# Patient Record
Sex: MALE | Race: BLACK OR AFRICAN AMERICAN | Employment: UNEMPLOYED | URBAN - METROPOLITAN AREA
[De-identification: names, ages, dates, MRNs, and addresses within clinical notes are randomized per-mention and may not be internally consistent; named-entity substitution may affect disease eponyms.]

---

## 2018-05-30 ENCOUNTER — HOSPITAL ENCOUNTER (EMERGENCY)
Facility: CLINIC | Age: 23
Discharge: HOME | End: 2018-05-30
Attending: EMERGENCY MEDICINE | Admitting: EMERGENCY MEDICINE

## 2018-05-30 VITALS
SYSTOLIC BLOOD PRESSURE: 115 MMHG | TEMPERATURE: 97.2 F | RESPIRATION RATE: 18 BRPM | WEIGHT: 135.2 LBS | DIASTOLIC BLOOD PRESSURE: 61 MMHG | OXYGEN SATURATION: 97 %

## 2018-05-30 DIAGNOSIS — F12.10 CANNABIS ABUSE: ICD-10-CM

## 2018-05-30 PROCEDURE — 99283 EMERGENCY DEPT VISIT LOW MDM: CPT

## 2018-05-30 PROCEDURE — 99283 EMERGENCY DEPT VISIT LOW MDM: CPT | Mod: Z6 | Performed by: EMERGENCY MEDICINE

## 2018-05-30 ASSESSMENT — ENCOUNTER SYMPTOMS
SHORTNESS OF BREATH: 0
FEVER: 0
ABDOMINAL PAIN: 0

## 2018-05-30 NOTE — ED AVS SNAPSHOT
[facility], Emergency Department    [address]    [place] [address]    Phone:  [phone]    Fax:  [phone]                                       Tommy Patrick   MRN: [id number]    Department:  [facility], Emergency Department   Date of Visit:  5/30/2018           Patient Information     Date Of Birth          1995        Your diagnoses for this visit were:     Cannabis abuse        You were seen by Washington Cid MD.        Discharge Instructions       Have an outpatient CD assessment, call [phone]    24 Hour Appointment Hotline       To make an appointment at any [facility] clinic, call [phone] ([phone]). If you don't have a family doctor or clinic, we will help you find one. [facility] clinics are conveniently located to serve the needs of you and your family.             Review of your medicines      Notice     You have not been prescribed any medications.            Orders Needing Specimen Collection     None      Pending Results     No orders found from 5/28/2018 to 5/31/2018.            Pending Culture Results     No orders found from 5/28/2018 to 5/31/2018.            Pending Results Instructions     If you had any lab results that were not finalized at the time of your Discharge, you can call the ED Lab Result RN at [phone]. You will be contacted by this team for any positive Lab results or changes in treatment. The nurses are available 7 days a week from 10A to 6:30P.  You can leave a message 24 hours per day and they will return your call.        Thank you for choosing [facility]       Thank you for choosing [facility] for your care. Our goal is always to provide you with excellent care. Hearing back from our patients is one way we can continue to improve our services. Please take a few minutes to complete the written survey that you may receive in the mail after you visit with us. Thank you!        [facility] Information     [facility] lets you send  "messages to your doctor, view your test results, renew your prescriptions, schedule appointments and more. To sign up, go to [email or web address] . Click on \"Log in\" on the left side of the screen, which will take you to the Welcome page. Then click on \"Sign up Now\" on the right side of the page.     You will be asked to enter the access code listed below, as well as some personal information. Please follow the directions to create your username and password.     Your access code is: [email or web address]  Expires: 2018  8:13 PM     Your access code will  in 90 days. If you need help or a new code, please call your [facility] clinic or [phone].        Care EveryWhere ID     This is your Care EveryWhere ID. This could be used by other organizations to access your [facility] medical records  [id number]        Equal Access to Services     JAMSHID MCKINLEY : Henrique Sierra, waerwin jimenez, qaely kaalmakaitlin bates, melvin rivera . So [facility] [phone].    ATENCIÓN: Si habla español, tiene a seaman disposición servicios gratuitos de asistencia lingüística. Llame al [phone].    We comply with applicable federal civil rights laws and [place] laws. We do not discriminate on the basis of race, color, national origin, age, disability, sex, sexual orientation, or gender identity.            After Visit Summary       This is your record. Keep this with you and show to your community pharmacist(s) and doctor(s) at your next visit.                  "

## 2018-05-30 NOTE — ED AVS SNAPSHOT
[facility], [place], Emergency Department    [address]    [facility] [address]    Phone:  [phone]    Fax:  [phone]                                       Tommy Patrick   MRN: [id number]    Department:  [facility], Emergency Department   Date of Visit:  5/30/2018           After Visit Summary Signature Page     I have received my discharge instructions, and my questions have been answered. I have discussed any challenges I see with this plan with the nurse or doctor.    ..........................................................................................................................................  Patient/Patient Representative Signature      ..........................................................................................................................................  Patient Representative Print Name and Relationship to Patient    ..................................................               ................................................  Date                                            Time    ..........................................................................................................................................  Reviewed by Signature/Title    ...................................................              ..............................................  Date                                                            Time

## 2018-05-31 NOTE — ED PROVIDER NOTES
History     Chief Complaint   Patient presents with     Addiction Problem     HPI  Tommy Patrick is an otherwise healthy 23 year old male who presents to the emergency department seeking detox from marijuana.  The patient states that he is worried about his marijuana use and it potentially leading to more dangerous street drugs, and he presents here seeking detox.  When informed that our facility does not offer a marijuana detox program, he reported that he would be amenable to undergoing treatment.  No other drug use.  No other physical complaints.      PAST MEDICAL HISTORY: History reviewed. No pertinent past medical history.    PAST SURGICAL HISTORY: History reviewed. No pertinent surgical history.    FAMILY HISTORY: No family history on file.    SOCIAL HISTORY:   Social History   Substance Use Topics     Smoking status: Current Every Day Smoker     Packs/day: 0.25     Smokeless tobacco: Never Used     Alcohol use Yes      Comment: binge drinks       Patient's Medications    No medications on file        No Known Allergies      I have reviewed the Medications, Allergies, Past Medical and Surgical History, and Social History in the Epic system.    Review of Systems   Constitutional: Negative for fever.   Respiratory: Negative for shortness of breath.    Cardiovascular: Negative for chest pain.   Gastrointestinal: Negative for abdominal pain.   All other systems reviewed and are negative.      Physical Exam   BP: 115/61  Heart Rate: 58  Temp: 97.2  F (36.2  C)  Resp: 18  Weight: 61.3 kg (135 lb 3.2 oz)  SpO2: 97 %      Physical Exam   Constitutional: He is oriented to person, place, and time. He appears well-developed and well-nourished. No distress.   Cardiovascular: Normal rate, regular rhythm and normal heart sounds.    Pulmonary/Chest: Effort normal and breath sounds normal.   Neurological: He is alert and oriented to person, place, and time.   Psychiatric: He has a normal mood and affect.   Nursing note  and vitals reviewed.      ED Course     ED Course     Procedures               Labs Ordered and Resulted from Time of ED Arrival Up to the Time of Departure from the ED - No data to display         Assessments & Plan (with Medical Decision Making)   23-year-old male who presents to the emergency department seeking detox from marijuana as further described above in the HPI.  I had a conversation with the patient regarding our facility's absence of marijuana detox, and I instead offered referral for CD treatment programs on an outpatient basis.  The patient is amenable to this, and  I will provide him with those resources.  He denies any other drug use.  No physical complaints at this time, and his exam is not concerning.    I have reviewed the nursing notes.    I have reviewed the findings, diagnosis, plan and need for follow up with the patient.    New Prescriptions    No medications on file       Final diagnoses:   Cannabis abuse   I, Rod Terry, am serving as a trained medical scribe to document services personally performed by Washington Cid MD, based on the provider's statements to me.      Washington SNOW MD, was physically present and have reviewed and verified the accuracy of this note documented by Rod Terry.       5/30/2018   [facility], [place], EMERGENCY DEPARTMENT     Washington Cid MD  05/30/18 2025